# Patient Record
Sex: FEMALE | URBAN - METROPOLITAN AREA
[De-identification: names, ages, dates, MRNs, and addresses within clinical notes are randomized per-mention and may not be internally consistent; named-entity substitution may affect disease eponyms.]

---

## 2019-03-26 ENCOUNTER — OFFICE VISIT (OUTPATIENT)
Dept: URBAN - METROPOLITAN AREA CLINIC 45 | Facility: CLINIC | Age: 74
End: 2019-03-26
Payer: MEDICARE

## 2019-03-26 PROCEDURE — 92133 CPTRZD OPH DX IMG PST SGM ON: CPT | Performed by: OPHTHALMOLOGY

## 2019-03-26 PROCEDURE — 76514 ECHO EXAM OF EYE THICKNESS: CPT | Performed by: OPHTHALMOLOGY

## 2019-03-26 PROCEDURE — 92002 INTRM OPH EXAM NEW PATIENT: CPT | Performed by: OPHTHALMOLOGY

## 2019-03-26 RX ORDER — OFLOXACIN 3 MG/ML
0.3 % SOLUTION/ DROPS OPHTHALMIC
Qty: 10 | Refills: 0 | Status: INACTIVE
Start: 2019-03-26 | End: 2020-12-10

## 2019-03-26 RX ORDER — DUREZOL 0.5 MG/ML
0.05 % EMULSION OPHTHALMIC
Qty: 1 | Refills: 1 | Status: INACTIVE
Start: 2019-03-26 | End: 2020-12-10

## 2019-03-26 ASSESSMENT — INTRAOCULAR PRESSURE
OD: 17
OS: 18

## 2019-03-26 ASSESSMENT — KERATOMETRY
OS: 43.63
OD: 43.75

## 2019-03-26 NOTE — IMPRESSION/PLAN
Impression: Ocular hypertension, bilateral: H40.053. TMAX 28/26, /516, 3/19 OCT 92/83 8/7 Plan: Discussed diagnosis with patient. Recommend patient continue Latanoprost QHS OU. Will continue to monitor. 6 month HVF, [place], ONH photos. Patient is part time resident. Ok to follow up with M.D. in [facility].

## 2020-12-10 ENCOUNTER — OFFICE VISIT (OUTPATIENT)
Dept: URBAN - METROPOLITAN AREA CLINIC 45 | Facility: CLINIC | Age: 75
End: 2020-12-10
Payer: MEDICARE

## 2020-12-10 DIAGNOSIS — H04.123 DRY EYE SYNDROME OF BILATERAL LACRIMAL GLANDS: ICD-10-CM

## 2020-12-10 DIAGNOSIS — H40.053 OCULAR HYPERTENSION, BILATERAL: Primary | ICD-10-CM

## 2020-12-10 DIAGNOSIS — H25.13 AGE-RELATED NUCLEAR CATARACT, BILATERAL: ICD-10-CM

## 2020-12-10 DIAGNOSIS — H43.811 VITREOUS DEGENERATION, RIGHT EYE: ICD-10-CM

## 2020-12-10 PROCEDURE — 92133 CPTRZD OPH DX IMG PST SGM ON: CPT | Performed by: OPHTHALMOLOGY

## 2020-12-10 PROCEDURE — 92014 COMPRE OPH EXAM EST PT 1/>: CPT | Performed by: OPHTHALMOLOGY

## 2020-12-10 ASSESSMENT — INTRAOCULAR PRESSURE
OS: 20
OD: 22

## 2020-12-10 NOTE — IMPRESSION/PLAN
Impression: Ocular hypertension, bilateral: H40.053. TMAX 28/26, /516, 12/20 OCT 91/89 6/9 Plan: Discussed diagnosis with patient. Recommend patient continue Latanoprost QHS OU. Will continue to monitor. 6 month HVF/[employer]. Patient is part time resident. Ok to follow up with M.D. in [place].